# Patient Record
Sex: FEMALE | Race: WHITE | NOT HISPANIC OR LATINO | Employment: FULL TIME | URBAN - METROPOLITAN AREA
[De-identification: names, ages, dates, MRNs, and addresses within clinical notes are randomized per-mention and may not be internally consistent; named-entity substitution may affect disease eponyms.]

---

## 2022-03-22 ENCOUNTER — TELEPHONE (OUTPATIENT)
Dept: SCHEDULING | Facility: IMAGING CENTER | Age: 27
End: 2022-03-22

## 2022-03-23 ENCOUNTER — OFFICE VISIT (OUTPATIENT)
Dept: MEDICAL GROUP | Facility: PHYSICIAN GROUP | Age: 27
End: 2022-03-23
Payer: COMMERCIAL

## 2022-03-23 VITALS
HEART RATE: 95 BPM | HEIGHT: 65 IN | OXYGEN SATURATION: 97 % | DIASTOLIC BLOOD PRESSURE: 62 MMHG | WEIGHT: 112.8 LBS | RESPIRATION RATE: 12 BRPM | TEMPERATURE: 98.7 F | SYSTOLIC BLOOD PRESSURE: 110 MMHG | BODY MASS INDEX: 18.8 KG/M2

## 2022-03-23 DIAGNOSIS — R20.2 PARESTHESIA OF BOTH LEGS: ICD-10-CM

## 2022-03-23 DIAGNOSIS — U09.9 POST COVID-19 CONDITION, UNSPECIFIED: ICD-10-CM

## 2022-03-23 DIAGNOSIS — R19.7 DIARRHEA, UNSPECIFIED TYPE: ICD-10-CM

## 2022-03-23 DIAGNOSIS — K59.00 CONSTIPATION, UNSPECIFIED CONSTIPATION TYPE: ICD-10-CM

## 2022-03-23 DIAGNOSIS — Z00.00 ANNUAL PHYSICAL EXAM: ICD-10-CM

## 2022-03-23 DIAGNOSIS — R11.2 NON-INTRACTABLE VOMITING WITH NAUSEA, UNSPECIFIED VOMITING TYPE: ICD-10-CM

## 2022-03-23 DIAGNOSIS — Z82.0 FAMILY HISTORY OF MS (MULTIPLE SCLEROSIS): ICD-10-CM

## 2022-03-23 PROCEDURE — 99204 OFFICE O/P NEW MOD 45 MIN: CPT | Performed by: NURSE PRACTITIONER

## 2022-03-23 ASSESSMENT — PATIENT HEALTH QUESTIONNAIRE - PHQ9: CLINICAL INTERPRETATION OF PHQ2 SCORE: 0

## 2022-03-23 NOTE — ASSESSMENT & PLAN NOTE
New to me. Diagnosed with covid early February. Lingering symptoms dizziness, fatigue, GI issues have not resolved, bloating, nausea, vomiting, diarrhea and constipation. No CP, SOB, fevers, no URI.

## 2022-03-23 NOTE — PROGRESS NOTES
Chief Complaint   Patient presents with   • Establish Care     Post covid havent been feeling 100% x 1 month ago   • GI Problem     Comes and goes, cant keep food down, threw up 5 times yesterday , constipation but also diarrhea        HISTORY OF PRESENT ILLNESS: Patient is a 26 y.o. female, new  patient who presents today to discuss medical problems as listed below:    Health Maintenance:  COMPLETED     Post covid-19 condition, unspecified  New to me. Diagnosed with covid early February. Lingering symptoms dizziness, fatigue, GI issues have not resolved, bloating, nausea, vomiting, diarrhea and constipation. No CP, SOB, fevers, no URI.      Family history of MS (multiple sclerosis)  FH of MS in mother at 53 with symptoms 30 yrs prior.  Pt reports weakness and numbness in R leg daily x 3 yrs, no experiencing in b/l legs and hands. Chronic and continuous in b/l legs and intermittent in hands.       Patient Active Problem List    Diagnosis Date Noted   • Post covid-19 condition, unspecified 2022   • Family history of MS (multiple sclerosis) 2022        Allergies: Codeine    No current outpatient medications on file.     No current facility-administered medications for this visit.       Social History     Tobacco Use   • Smoking status: Former Smoker     Years: 4.00     Types: Cigarettes     Quit date: 2018     Years since quittin.2   • Smokeless tobacco: Never Used   • Tobacco comment: occ   Vaping Use   • Vaping Use: Every day   • Substances: Nicotine   • Devices: Refillable tank   Substance Use Topics   • Alcohol use: Yes   • Drug use: Yes     Types: Marijuana, Inhaled     Social History     Social History Narrative   • Not on file       Family History   Problem Relation Age of Onset   • Multiple Sclerosis Mother    • Brain Aneurysm Father        Allergies, past medical history, past surgical history, family history, social history reviewed and updated.    Review of Systems:     - Constitutional:  "Negative for fever, chills, unexpected weight change, and fatigue/generalized weakness.     - HEENT: Negative for headaches, vision changes, hearing changes, ear pain, ear discharge, rhinorrhea, sinus congestion, sore throat, and neck pain.      - Respiratory: Negative for cough, sputum production, chest congestion, dyspnea, wheezing, and crackles.      - Cardiovascular: Negative for chest pain, palpitations, orthopnea, and bilateral lower extremity edema.     - Gastrointestinal: n/v/d/c. Negative for heartburn,  abdominal pain, hematochezia, melena,  and greasy/foul-smelling stools.     - Genitourinary: Negative for dysuria, polyuria, hematuria, pyuria, urinary urgency, and urinary incontinence.    - Musculoskeletal: Negative for myalgias, back pain, and joint pain.     - Skin: Negative for rash, itching, cyanotic skin color change.     - Neurological: dizziness, weakness and numbness in b/l lower nad upper extremities.     - Endo/Heme/Allergies: Does not bruise/bleed easily.     - Psychiatric/Behavioral: Negative for depression, suicidal/homicidal ideation and memory loss.      All other systems reviewed and are negative    Exam:    /62   Pulse 95   Temp 37.1 °C (98.7 °F) (Temporal)   Resp 12   Ht 1.651 m (5' 5\")   Wt 51.2 kg (112 lb 12.8 oz)   LMP 03/03/2022   SpO2 97%   BMI 18.77 kg/m²  Body mass index is 18.77 kg/m².    Physical Exam:  Constitutional: Well-developed and well-nourished. Not diaphoretic. No distress.   Skin: transverse old scar above R knee. Skin is warm and dry. No rash noted.  Head: Atraumatic without lesions.  Eyes: Conjunctivae and extraocular motions are normal. Pupils are equal, round, and reactive to light. No scleral icterus.   Ears:  External ears unremarkable. Tympanic membranes clear and intact.  Nose: Nares patent. Septum midline. Turbinates without erythema nor edema. No discharge.   Mouth/Throat: Dentition is normal. Tongue normal. Oropharynx is clear and moist. " Posterior pharynx without erythema or exudates.  Neck: Supple, trachea midline. Normal range of motion. No thyromegaly present. No lymphadenopathy--cervical or supraclavicular.  Cardiovascular: Regular rate and rhythm, S1 and S2 without murmur, rubs, or gallops.    Chest: Effort normal. Clear to auscultation throughout. No adventitious sounds. No CVA tenderness.  Abdomen: Soft, non tender, and without distention. Active bowel sounds in all four quadrants. No rebound, guarding, masses or HSM.  : Negative for dysuria, polyuria, hematuria, pyuria, urinary urgency, and urinary incontinence.  Extremities: No cyanosis, clubbing, erythema, nor edema. Distal pulses intact and symmetric.   Musculoskeletal: All major joints AROM full in all directions without pain.  Neurological: Alert and oriented x 3. DTRs 2+/3 and symmetric. No cranial nerve deficit. 5/5 myotomes. Sensation intact. Negative Rhomberg.  Psychiatric:  Behavior, mood, and affect are appropriate.  MA/nursing note and vitals reviewed.    Assessment/Plan:  1. Post covid-19 condition, unspecified  Will obtain labs and discuss findings with pt at follow up    2. Diarrhea, unspecified type  Pt reported gluten rich diet, symptoms are consistent with gluten sensitivity exacerbated post Covid. Will check for Celiac, f/ u next visit  - Comp Metabolic Panel; Future  - CELIAC DISEASE AB PANEL; Future    3. Non-intractable vomiting with nausea, unspecified vomiting type  As above  - CELIAC DISEASE AB PANEL; Future    4. Constipation, unspecified constipation type  - CELIAC DISEASE AB PANEL; Future    5. Annual physical exam  - CBC WITHOUT DIFFERENTIAL; Future  - Comp Metabolic Panel; Future  - Lipid Profile; Future  - TSH; Future  - VITAMIN D,25 HYDROXY; Future  - CELIAC DISEASE AB PANEL; Future    6. Family history of MS (multiple sclerosis)  - Referral to Neurology    7. Paresthesia of both legs  - Referral to Neurology       Discussed with patient possible alternative  diagnoses, pt is to take all medications as prescribed. If symptoms persist FU w/PCP, if symptoms worsen go to emergency room. If experiencing any side effects from prescribed medications reports to the office immediately or go to emergency room.  Reviewed indication, dosage, usage and potential adverse effects of prescribed medications. Reviewed risks and benefits of treatment plan. Patient verbalizes understanding of all instruction and verbally agrees to plan.    No follow-ups on file. 2-3 wks

## 2022-03-23 NOTE — ASSESSMENT & PLAN NOTE
FH of MS in mother at 53 with symptoms 30 yrs prior.  Pt reports weakness and numbness in R leg daily x 3 yrs, no experiencing in b/l legs and hands. Chronic and continuous in b/l legs and intermittent in hands.

## 2022-06-01 ENCOUNTER — APPOINTMENT (OUTPATIENT)
Dept: NEUROLOGY | Facility: MEDICAL CENTER | Age: 27
End: 2022-06-01
Attending: PSYCHIATRY & NEUROLOGY
Payer: COMMERCIAL

## 2024-10-14 ENCOUNTER — TELEPHONE (OUTPATIENT)
Dept: HEALTH INFORMATION MANAGEMENT | Facility: OTHER | Age: 29
End: 2024-10-14
Payer: COMMERCIAL

## 2024-12-13 SDOH — ECONOMIC STABILITY: TRANSPORTATION INSECURITY
IN THE PAST 12 MONTHS, HAS THE LACK OF TRANSPORTATION KEPT YOU FROM MEDICAL APPOINTMENTS OR FROM GETTING MEDICATIONS?: NO

## 2024-12-13 SDOH — ECONOMIC STABILITY: INCOME INSECURITY: HOW HARD IS IT FOR YOU TO PAY FOR THE VERY BASICS LIKE FOOD, HOUSING, MEDICAL CARE, AND HEATING?: NOT HARD AT ALL

## 2024-12-13 SDOH — ECONOMIC STABILITY: INCOME INSECURITY: IN THE LAST 12 MONTHS, WAS THERE A TIME WHEN YOU WERE NOT ABLE TO PAY THE MORTGAGE OR RENT ON TIME?: NO

## 2024-12-13 SDOH — HEALTH STABILITY: PHYSICAL HEALTH: ON AVERAGE, HOW MANY DAYS PER WEEK DO YOU ENGAGE IN MODERATE TO STRENUOUS EXERCISE (LIKE A BRISK WALK)?: 0 DAYS

## 2024-12-13 SDOH — ECONOMIC STABILITY: FOOD INSECURITY: WITHIN THE PAST 12 MONTHS, YOU WORRIED THAT YOUR FOOD WOULD RUN OUT BEFORE YOU GOT MONEY TO BUY MORE.: NEVER TRUE

## 2024-12-13 SDOH — ECONOMIC STABILITY: FOOD INSECURITY: WITHIN THE PAST 12 MONTHS, THE FOOD YOU BOUGHT JUST DIDN'T LAST AND YOU DIDN'T HAVE MONEY TO GET MORE.: NEVER TRUE

## 2024-12-13 SDOH — ECONOMIC STABILITY: TRANSPORTATION INSECURITY
IN THE PAST 12 MONTHS, HAS LACK OF RELIABLE TRANSPORTATION KEPT YOU FROM MEDICAL APPOINTMENTS, MEETINGS, WORK OR FROM GETTING THINGS NEEDED FOR DAILY LIVING?: NO

## 2024-12-13 SDOH — ECONOMIC STABILITY: HOUSING INSECURITY
IN THE LAST 12 MONTHS, WAS THERE A TIME WHEN YOU DID NOT HAVE A STEADY PLACE TO SLEEP OR SLEPT IN A SHELTER (INCLUDING NOW)?: NO

## 2024-12-13 SDOH — HEALTH STABILITY: PHYSICAL HEALTH: ON AVERAGE, HOW MANY MINUTES DO YOU ENGAGE IN EXERCISE AT THIS LEVEL?: 0 MIN

## 2024-12-13 SDOH — ECONOMIC STABILITY: TRANSPORTATION INSECURITY
IN THE PAST 12 MONTHS, HAS LACK OF TRANSPORTATION KEPT YOU FROM MEETINGS, WORK, OR FROM GETTING THINGS NEEDED FOR DAILY LIVING?: NO

## 2024-12-13 SDOH — HEALTH STABILITY: MENTAL HEALTH
STRESS IS WHEN SOMEONE FEELS TENSE, NERVOUS, ANXIOUS, OR CAN'T SLEEP AT NIGHT BECAUSE THEIR MIND IS TROUBLED. HOW STRESSED ARE YOU?: TO SOME EXTENT

## 2024-12-13 ASSESSMENT — SOCIAL DETERMINANTS OF HEALTH (SDOH)
HOW OFTEN DO YOU ATTENT MEETINGS OF THE CLUB OR ORGANIZATION YOU BELONG TO?: NEVER
HOW OFTEN DO YOU GET TOGETHER WITH FRIENDS OR RELATIVES?: ONCE A WEEK
WITHIN THE PAST 12 MONTHS, YOU WORRIED THAT YOUR FOOD WOULD RUN OUT BEFORE YOU GOT THE MONEY TO BUY MORE: NEVER TRUE
HOW MANY DRINKS CONTAINING ALCOHOL DO YOU HAVE ON A TYPICAL DAY WHEN YOU ARE DRINKING: 1 OR 2
HOW OFTEN DO YOU ATTENT MEETINGS OF THE CLUB OR ORGANIZATION YOU BELONG TO?: NEVER
HOW OFTEN DO YOU GET TOGETHER WITH FRIENDS OR RELATIVES?: ONCE A WEEK
IN THE PAST 12 MONTHS, HAS THE ELECTRIC, GAS, OIL, OR WATER COMPANY THREATENED TO SHUT OFF SERVICE IN YOUR HOME?: NO
IN A TYPICAL WEEK, HOW MANY TIMES DO YOU TALK ON THE PHONE WITH FAMILY, FRIENDS, OR NEIGHBORS?: THREE TIMES A WEEK
IN A TYPICAL WEEK, HOW MANY TIMES DO YOU TALK ON THE PHONE WITH FAMILY, FRIENDS, OR NEIGHBORS?: THREE TIMES A WEEK
DO YOU BELONG TO ANY CLUBS OR ORGANIZATIONS SUCH AS CHURCH GROUPS UNIONS, FRATERNAL OR ATHLETIC GROUPS, OR SCHOOL GROUPS?: NO
HOW OFTEN DO YOU HAVE SIX OR MORE DRINKS ON ONE OCCASION: LESS THAN MONTHLY
HOW HARD IS IT FOR YOU TO PAY FOR THE VERY BASICS LIKE FOOD, HOUSING, MEDICAL CARE, AND HEATING?: NOT HARD AT ALL
DO YOU BELONG TO ANY CLUBS OR ORGANIZATIONS SUCH AS CHURCH GROUPS UNIONS, FRATERNAL OR ATHLETIC GROUPS, OR SCHOOL GROUPS?: NO
HOW OFTEN DO YOU ATTEND CHURCH OR RELIGIOUS SERVICES?: NEVER
HOW OFTEN DO YOU ATTEND CHURCH OR RELIGIOUS SERVICES?: NEVER
HOW OFTEN DO YOU HAVE A DRINK CONTAINING ALCOHOL: 2-3 TIMES A WEEK

## 2024-12-13 ASSESSMENT — LIFESTYLE VARIABLES
AUDIT-C TOTAL SCORE: 4
HOW MANY STANDARD DRINKS CONTAINING ALCOHOL DO YOU HAVE ON A TYPICAL DAY: 1 OR 2
SKIP TO QUESTIONS 9-10: 0
HOW OFTEN DO YOU HAVE A DRINK CONTAINING ALCOHOL: 2-3 TIMES A WEEK
HOW OFTEN DO YOU HAVE SIX OR MORE DRINKS ON ONE OCCASION: LESS THAN MONTHLY

## 2024-12-16 ENCOUNTER — APPOINTMENT (OUTPATIENT)
Dept: MEDICAL GROUP | Facility: LAB | Age: 29
End: 2024-12-16
Payer: COMMERCIAL

## 2025-01-02 ENCOUNTER — APPOINTMENT (OUTPATIENT)
Dept: MEDICAL GROUP | Facility: MEDICAL CENTER | Age: 30
End: 2025-01-02
Payer: COMMERCIAL

## 2025-01-03 ENCOUNTER — OFFICE VISIT (OUTPATIENT)
Dept: MEDICAL GROUP | Facility: MEDICAL CENTER | Age: 30
End: 2025-01-03
Payer: COMMERCIAL

## 2025-01-03 VITALS
SYSTOLIC BLOOD PRESSURE: 122 MMHG | OXYGEN SATURATION: 92 % | BODY MASS INDEX: 18.73 KG/M2 | DIASTOLIC BLOOD PRESSURE: 80 MMHG | WEIGHT: 112.43 LBS | TEMPERATURE: 97.8 F | RESPIRATION RATE: 19 BRPM | HEIGHT: 65 IN | HEART RATE: 90 BPM

## 2025-01-03 DIAGNOSIS — Z00.00 PE (PHYSICAL EXAM), ANNUAL: ICD-10-CM

## 2025-01-03 DIAGNOSIS — F32.A DEPRESSION, UNSPECIFIED DEPRESSION TYPE: ICD-10-CM

## 2025-01-03 DIAGNOSIS — Z11.4 SCREENING FOR HIV (HUMAN IMMUNODEFICIENCY VIRUS): ICD-10-CM

## 2025-01-03 DIAGNOSIS — F43.9 STRESS: ICD-10-CM

## 2025-01-03 DIAGNOSIS — F41.9 ANXIETY: ICD-10-CM

## 2025-01-03 DIAGNOSIS — J45.990 EXERCISE-INDUCED ASTHMA: ICD-10-CM

## 2025-01-03 DIAGNOSIS — Z11.59 NEED FOR HEPATITIS C SCREENING TEST: ICD-10-CM

## 2025-01-03 PROCEDURE — 3079F DIAST BP 80-89 MM HG: CPT | Performed by: NURSE PRACTITIONER

## 2025-01-03 PROCEDURE — 99214 OFFICE O/P EST MOD 30 MIN: CPT | Mod: 25 | Performed by: NURSE PRACTITIONER

## 2025-01-03 PROCEDURE — 3074F SYST BP LT 130 MM HG: CPT | Performed by: NURSE PRACTITIONER

## 2025-01-03 PROCEDURE — 99395 PREV VISIT EST AGE 18-39: CPT | Performed by: NURSE PRACTITIONER

## 2025-01-03 RX ORDER — ALBUTEROL SULFATE 90 UG/1
2 INHALANT RESPIRATORY (INHALATION) EVERY 6 HOURS PRN
Qty: 8.5 G | Refills: 1 | Status: SHIPPED | OUTPATIENT
Start: 2025-01-03 | End: 2025-01-06 | Stop reason: SDUPTHER

## 2025-01-03 RX ORDER — HYDROXYZINE HYDROCHLORIDE 10 MG/1
10 TABLET, FILM COATED ORAL 3 TIMES DAILY PRN
Qty: 90 TABLET | Refills: 0 | Status: SHIPPED | OUTPATIENT
Start: 2025-01-03 | End: 2025-01-06 | Stop reason: SDUPTHER

## 2025-01-03 NOTE — PROGRESS NOTES
Patient agreed to using ANTIONETTE: yes    Mayuri was seen today for gynecologic exam.    Diagnoses and all orders for this visit:    Anxiety  -     Referral to Psychology  -     hydrOXYzine HCl (ATARAX) 10 MG Tab; Take 1 Tablet by mouth 3 times a day as needed for Anxiety.    Stress  -     Referral to Psychology  -     hydrOXYzine HCl (ATARAX) 10 MG Tab; Take 1 Tablet by mouth 3 times a day as needed for Anxiety.    Depression, unspecified depression type  -     Referral to Psychology    Exercise-induced asthma  -     albuterol 108 (90 Base) MCG/ACT Aero Soln inhalation aerosol; Inhale 2 Puffs every 6 hours as needed for Shortness of Breath.    PE (physical exam), annual  -     CBC WITHOUT DIFFERENTIAL; Future  -     Comp Metabolic Panel; Future  -     Lipid Profile; Future  -     VITAMIN D,25 HYDROXY (DEFICIENCY); Future  -     TSH WITH REFLEX TO FT4; Future              Assessment & Plan  1. Anxiety.  2.  Depression  3.  Situational stress  Chronic and stable condition, she reports significant anxiety and situational depression, exacerbated by stress. Hydroxyzine 10 mg has been prescribed, to be taken up to three times daily, with the option of two tablets at night to aid sleep. Potential side effects, including drowsiness, have been discussed. She has been advised to engage in meditation and deep breathing exercises. A referral to a therapy in [place] has been made.    4. Exercise-induced asthma.  Chronic and stable condition.  Currently no exacerbations.  An albuterol rescue inhaler has been prescribed for emergency use during physical activities such as hiking.    5. Health maintenance.  6.  Annual physical exam.  A comprehensive lab panel, including vitamin D and thyroid function tests, has been ordered. HIV and hepatitis C tests have also been included. A Pap smear has been scheduled. She has declined the pneumonia and influenza vaccines.    Follow-up  The patient will follow up in 6 to 12 weeks.    History of  Present Illness  The patient presents for an annual exam.    She reports experiencing balance issues, which she attributes to stress rather than neurological symptoms. She has not sought neurological evaluation for these symptoms. She has a family history of a mass in her mother.    She is currently under significant stress, leading to anxiety and intermittent depression. She is actively seeking therapy in [place]. She does not endorse any suicidal ideation. She was previously prescribed an antidepressant by her general practitioner, Kyara Tierney, in 10/2022, but discontinued it after 2 months due to lack of efficacy. She has a history of panic attacks, which have decreased in frequency since the death of her mother. She finds support in her family and engages in morning walks and other exercises as part of her self-care regimen.    She has a history of exercise-induced asthma, attributed to her mother's smoking habit, but reports no current symptoms such as chest tightness, cough, or wheezing.    She maintains a regular diet and hydration. She is uncertain about the date of her last Pap smear but believes it was more than 5 years ago. She reports no abnormal menstrual periods. She does not take aspirin and rarely uses Tylenol for headaches. She admits to occasional marijuana use. She applies sunscreen daily. She reports no concerns related to sexually transmitted diseases. She declines the pneumonia and influenza vaccines. She reports no urinary tract infections in the past 6 months, hematochezia, or hematuria. She experiences bloating, which she associates with her menstrual cycle and dietary intake. She reports no dysphagia.    Supplemental Information  She was on Adderall as a child for ADHD. She feels like those symptoms help her stride at work.    SOCIAL HISTORY  She uses marijuana.    FAMILY HISTORY  Her mother had a mass. She does not have a family history of thyroid issues or breast  cancer.    MEDICATIONS  Past: Adderall    IMMUNIZATIONS  She declined the pneumonia and influenza vaccines.       Ob-Gyn/ History:    Last Pap Smear:     yrs ago. no history of abnormal pap smears.    Health Maintenance  Below Anticipatory guidance discussed with patient  Cholesterol Screening: labs  Diabetes Screening: labs  Aspirin Use: no    Diet: regular   Exercise: active   Substance Abuse: no   Safe in relationship.   Seat belts, bike helmet, gun safety discussed.  Sun protection used.    Cancer screening    Cervical Cancer Screening: will schedule      Infectious disease screening/Immunizations  --STI Screening: no concerns    --Practices safe sex.  --HIV Screening: labs   --Hepatitis C Screening: labs   --Immunizations: pt declined vaccines     ROS     He  has no past medical history on file.  He  has no past surgical history on file.  Family History   Problem Relation Age of Onset    Multiple Sclerosis Mother     Brain Aneurysm Father      Social History     Tobacco Use    Smoking status: Former     Current packs/day: 0.00     Types: Cigarettes     Start date:      Quit date:      Years since quittin.0    Smokeless tobacco: Never    Tobacco comments:     occ   Vaping Use    Vaping status: Every Day    Substances: Nicotine    Devices: [employer]   Substance Use Topics    Alcohol use: Yes    Drug use: Yes     Types: Marijuana, Inhaled     Patient Active Problem List    Diagnosis Date Noted    Post covid-19 condition, unspecified 2022    Family history of MS (multiple sclerosis) 2022     Current Outpatient Medications   Medication Sig Dispense Refill    albuterol 108 (90 Base) MCG/ACT Aero Soln inhalation aerosol Inhale 2 Puffs every 6 hours as needed for Shortness of Breath. 8.5 g 1    hydrOXYzine HCl (ATARAX) 10 MG Tab Take 1 Tablet by mouth 3 times a day as needed for Anxiety. 90 Tablet 0     No current facility-administered medications for this visit.    (including changes  "today)  Allergies: Codeine    /80   Pulse 90   Temp 36.6 °C (97.8 °F) (Temporal)   Resp 19   Ht 1.651 m (5' 5\")   Wt 51 kg (112 lb 7 oz)   SpO2 92%      Physical Exam     Results         No follow-ups on file. Annual, PRN    "

## 2025-01-06 ENCOUNTER — PATIENT MESSAGE (OUTPATIENT)
Dept: MEDICAL GROUP | Facility: MEDICAL CENTER | Age: 30
End: 2025-01-06
Payer: COMMERCIAL

## 2025-01-06 DIAGNOSIS — F41.9 ANXIETY: ICD-10-CM

## 2025-01-06 DIAGNOSIS — F43.9 STRESS: ICD-10-CM

## 2025-01-06 DIAGNOSIS — J45.990 EXERCISE-INDUCED ASTHMA: ICD-10-CM

## 2025-01-07 ENCOUNTER — TELEPHONE (OUTPATIENT)
Dept: MEDICAL GROUP | Facility: MEDICAL CENTER | Age: 30
End: 2025-01-07
Payer: COMMERCIAL

## 2025-01-07 RX ORDER — ALBUTEROL SULFATE 90 UG/1
2 INHALANT RESPIRATORY (INHALATION) EVERY 6 HOURS PRN
Qty: 8.5 G | Refills: 1 | Status: SHIPPED | OUTPATIENT
Start: 2025-01-07

## 2025-01-07 RX ORDER — HYDROXYZINE HYDROCHLORIDE 10 MG/1
10 TABLET, FILM COATED ORAL 3 TIMES DAILY PRN
Qty: 90 TABLET | Refills: 0 | Status: SHIPPED | OUTPATIENT
Start: 2025-01-07

## 2025-01-07 NOTE — TELEPHONE ENCOUNTER
1. Name: Mayuri Mujica      Call Back Number: [phone]        How would the patient prefer to be contacted with a response: Yazmin thomas    Pt called saying she called recently asking for med refills of Albuterol and Atarax. She said the pharmacy hasn't received the prescriptions yet. She would like a call back to see how much longer it will be. 01/07/2025

## 2025-01-08 ENCOUNTER — PATIENT MESSAGE (OUTPATIENT)
Dept: MEDICAL GROUP | Facility: MEDICAL CENTER | Age: 30
End: 2025-01-08
Payer: COMMERCIAL

## 2025-01-11 NOTE — TELEPHONE ENCOUNTER
Left pt a detailed vm stating that I contacted [facility] and both prescriptions pt requested are ready for .

## 2025-01-16 ENCOUNTER — APPOINTMENT (OUTPATIENT)
Dept: LAB | Facility: MEDICAL CENTER | Age: 30
End: 2025-01-16
Attending: NURSE PRACTITIONER
Payer: COMMERCIAL

## 2025-02-21 ENCOUNTER — OFFICE VISIT (OUTPATIENT)
Dept: MEDICAL GROUP | Facility: MEDICAL CENTER | Age: 30
End: 2025-02-21
Payer: COMMERCIAL

## 2025-02-21 VITALS
OXYGEN SATURATION: 99 % | WEIGHT: 110.23 LBS | BODY MASS INDEX: 17.72 KG/M2 | DIASTOLIC BLOOD PRESSURE: 58 MMHG | TEMPERATURE: 98.4 F | HEART RATE: 88 BPM | SYSTOLIC BLOOD PRESSURE: 100 MMHG | HEIGHT: 66 IN

## 2025-02-21 DIAGNOSIS — F43.9 STRESS: ICD-10-CM

## 2025-02-21 DIAGNOSIS — F32.A DEPRESSION, UNSPECIFIED DEPRESSION TYPE: ICD-10-CM

## 2025-02-21 DIAGNOSIS — F41.9 ANXIETY: ICD-10-CM

## 2025-02-21 DIAGNOSIS — J45.990 EXERCISE-INDUCED ASTHMA: ICD-10-CM

## 2025-02-21 PROCEDURE — 99214 OFFICE O/P EST MOD 30 MIN: CPT | Performed by: NURSE PRACTITIONER

## 2025-02-21 PROCEDURE — 3078F DIAST BP <80 MM HG: CPT | Performed by: NURSE PRACTITIONER

## 2025-02-21 PROCEDURE — 3074F SYST BP LT 130 MM HG: CPT | Performed by: NURSE PRACTITIONER

## 2025-02-21 RX ORDER — HYDROXYZINE HYDROCHLORIDE 10 MG/1
10 TABLET, FILM COATED ORAL 2 TIMES DAILY PRN
Qty: 180 TABLET | Refills: 1 | Status: SHIPPED | OUTPATIENT
Start: 2025-02-21

## 2025-02-21 ASSESSMENT — PATIENT HEALTH QUESTIONNAIRE - PHQ9: CLINICAL INTERPRETATION OF PHQ2 SCORE: 0

## 2025-02-21 ASSESSMENT — ENCOUNTER SYMPTOMS
FEVER: 0
PALPITATIONS: 0
CHILLS: 0

## 2025-02-21 NOTE — PROGRESS NOTES
Patient agreed to use of ANTIONETTE: yes  Chief Complaint   Patient presents with    Gynecologic Exam    Lab Results    Referral Needed       HISTORY OF PRESENT ILLNESS: Patient is a pleasant 29 y.o. female, established patient who presents today to discuss medical problems as listed below:    Assessment/Plan:    Marly was seen today for gynecologic exam, lab results and referral needed.    Diagnoses and all orders for this visit:    Anxiety  -     hydrOXYzine HCl (ATARAX) 10 MG Tab; Take 1 Tablet by mouth 2 times a day as needed for Anxiety.    Depression, unspecified depression type    Stress  -     hydrOXYzine HCl (ATARAX) 10 MG Tab; Take 1 Tablet by mouth 2 times a day as needed for Anxiety.    Exercise-induced asthma              Assessment & Plan  1. Anxiety and depression.  2.  Stress  Chronic and stable condition.  Significant improvement in stress with Atarax will continue.  She reports significant improvement in her anxiety symptoms with the use of hydroxyzine, taking 10 mg in the morning and occasionally another 10 mg at night if needed. She has been able to engage in public speaking without experiencing a panic attack. She is advised to continue this regimen, taking the medication as needed. She is also encouraged to seek therapy through the [employer] website to find a suitable therapist. A prescription for hydroxyzine 10 mg, to be taken twice daily as needed, will be provided for a duration of 90 days with refills. The prescription will be sent to [facility] in [place].    3. Asthma.  Chronic and stable condition.  Well-controlled with lifestyle modifications and avoidance of triggers.  No need for albuterol refill today.  Her asthma is currently well-controlled, and she has not needed to use her albuterol. She is advised to continue monitoring her symptoms and use albuterol if necessary.    4. Health maintenance.  She is advised to complete her pending lab work before the Pap smear appointment. If  "her vitamin D levels are found to be low, she will be started on a supplement, which can be obtained over the counter.    History of Present Illness  The patient presents for a 12-week follow-up.    She has been experiencing anxiety, depression, and stress. She reports a positive response to hydroxyzine, which she takes once in the morning and occasionally at night if needed. She was able to engage in public speaking without experiencing a panic attack, an activity she typically finds distressing. She sought mental health services at a facility in [place] but found it unsuitable due to the court-ordered nature of the staff and patients. She is currently on a waiting list for an alternative mental health service provider. She does not endorse any suicidal ideation or feelings of worthlessness.    Her asthma is well-managed, with no reported issues. She experiences mild shortness of breath during physical exertion. She has not required the use of albuterol.    She has not yet completed her laboratory tests due to misplaced paperwork and scheduling conflicts. She is here for a medication follow-up. She had her regular physical exam on 01/03/2025.    MEDICATIONS  Hydroxyzine, albuterol.    Health Maintenance:  COMPLETED     Allergies, past medical history, past surgical history, family history, social history reviewed and updated.    Review of Systems   Constitutional:  Negative for chills, fever and malaise/fatigue.   Cardiovascular:  Negative for chest pain, palpitations and leg swelling.        Exam:    /58   Pulse 88   Temp 36.9 °C (98.4 °F) (Temporal)   Ht 1.676 m (5' 6\")   Wt 50 kg (110 lb 3.7 oz)   SpO2 99%   BMI 17.79 kg/m²  Body mass index is 17.79 kg/m².    Physical Exam  Constitutional:       General: She is not in acute distress.     Appearance: She is not ill-appearing.   HENT:      Head: Normocephalic and atraumatic.      Nose: Nose normal.   Eyes:      General:         Right eye: No discharge.  "        Left eye: No discharge.   Cardiovascular:      Rate and Rhythm: Normal rate.      Pulses: Normal pulses.      Heart sounds: Normal heart sounds. No murmur heard.  Pulmonary:      Effort: Pulmonary effort is normal. No respiratory distress.      Breath sounds: Normal breath sounds. No stridor. No wheezing or rales.   Skin:     Findings: No rash.   Neurological:      General: No focal deficit present.      Mental Status: She is alert. Mental status is at baseline.   Psychiatric:         Mood and Affect: Mood normal.         Behavior: Behavior normal.          Results     Discussed with patient possible alternative diagnoses, patient is to take all medications as prescribed.      If symptoms persist FU w/PCP, if symptoms worsen go to emergency room.      If experiencing any side effects from prescribed medications report to the office immediately or go to emergency room.     Reviewed indication, dosage, usage and potential adverse effects of prescribed medications.      Reviewed risks and benefits of treatment plan. Patient verbalizes understanding of all instruction and verbally agrees to plan.     Discussed plan with the patient, and patient agrees to the above.      I personally reviewed prior external notes and test results pertinent to today's visit.      No follow-ups on file.